# Patient Record
Sex: MALE | Race: WHITE | NOT HISPANIC OR LATINO | Employment: FULL TIME
[De-identification: names, ages, dates, MRNs, and addresses within clinical notes are randomized per-mention and may not be internally consistent; named-entity substitution may affect disease eponyms.]

---

## 2017-10-31 ENCOUNTER — CHARTING TRANS (OUTPATIENT)
Dept: OTHER | Age: 42
End: 2017-10-31

## 2017-10-31 ASSESSMENT — PAIN SCALES - GENERAL: PAINLEVEL_OUTOF10: 5

## 2018-12-02 VITALS
RESPIRATION RATE: 18 BRPM | HEART RATE: 74 BPM | DIASTOLIC BLOOD PRESSURE: 74 MMHG | TEMPERATURE: 98.2 F | SYSTOLIC BLOOD PRESSURE: 120 MMHG

## 2018-12-11 ENCOUNTER — HOSPITAL (OUTPATIENT)
Dept: OTHER | Age: 43
End: 2018-12-11
Attending: EMERGENCY MEDICINE

## 2018-12-11 LAB — S PYO AG THROAT QL: NEGATIVE

## 2018-12-13 LAB — CULTURE STREP GRP A (STTH) HL: NORMAL

## 2020-08-05 ENCOUNTER — WALK IN (OUTPATIENT)
Dept: URGENT CARE | Age: 45
End: 2020-08-05
Attending: EMERGENCY MEDICINE

## 2020-08-05 VITALS
SYSTOLIC BLOOD PRESSURE: 126 MMHG | RESPIRATION RATE: 16 BRPM | TEMPERATURE: 96 F | WEIGHT: 185.19 LBS | DIASTOLIC BLOOD PRESSURE: 84 MMHG | OXYGEN SATURATION: 98 % | HEART RATE: 62 BPM

## 2020-08-05 DIAGNOSIS — S76.211A INGUINAL STRAIN, RIGHT, INITIAL ENCOUNTER: Primary | ICD-10-CM

## 2020-08-05 PROCEDURE — 99212 OFFICE O/P EST SF 10 MIN: CPT

## 2020-08-05 SDOH — HEALTH STABILITY: MENTAL HEALTH: HOW OFTEN DO YOU HAVE A DRINK CONTAINING ALCOHOL?: 2-3 TIMES A WEEK

## 2020-08-05 ASSESSMENT — ENCOUNTER SYMPTOMS
PSYCHIATRIC NEGATIVE: 1
GASTROINTESTINAL NEGATIVE: 1
RESPIRATORY NEGATIVE: 1
CONSTITUTIONAL NEGATIVE: 1

## 2021-06-18 ENCOUNTER — WALK IN (OUTPATIENT)
Dept: URGENT CARE | Age: 46
End: 2021-06-18
Attending: EMERGENCY MEDICINE

## 2021-06-18 VITALS
OXYGEN SATURATION: 97 % | HEART RATE: 62 BPM | SYSTOLIC BLOOD PRESSURE: 134 MMHG | WEIGHT: 187 LBS | RESPIRATION RATE: 16 BRPM | TEMPERATURE: 96.5 F | DIASTOLIC BLOOD PRESSURE: 82 MMHG

## 2021-06-18 DIAGNOSIS — N48.1 BALANITIS: ICD-10-CM

## 2021-06-18 DIAGNOSIS — N34.2 URETHRITIS: Primary | ICD-10-CM

## 2021-06-18 LAB
APPEARANCE UR: CLEAR
BILIRUB UR QL STRIP: NEGATIVE
COLOR UR: YELLOW
GLUCOSE UR STRIP-MCNC: NEGATIVE MG/DL
HGB UR QL STRIP: NEGATIVE
KETONES UR STRIP-MCNC: NEGATIVE MG/DL
LEUKOCYTE ESTERASE UR QL STRIP: NEGATIVE
NITRITE UR QL STRIP: NEGATIVE
PH UR STRIP: 6.5 UNITS (ref 5–7)
PROT UR STRIP-MCNC: NEGATIVE MG/DL
SP GR UR STRIP: >1.03 (ref 1–1.03)
UROBILINOGEN UR STRIP-MCNC: 0.2 MG/DL

## 2021-06-18 PROCEDURE — 99212 OFFICE O/P EST SF 10 MIN: CPT

## 2021-06-18 PROCEDURE — 87491 CHLMYD TRACH DNA AMP PROBE: CPT | Performed by: EMERGENCY MEDICINE

## 2021-06-18 PROCEDURE — 81003 URINALYSIS AUTO W/O SCOPE: CPT | Performed by: EMERGENCY MEDICINE

## 2021-06-18 PROCEDURE — 87086 URINE CULTURE/COLONY COUNT: CPT | Performed by: EMERGENCY MEDICINE

## 2021-06-18 RX ORDER — LEVOCETIRIZINE DIHYDROCHLORIDE 5 MG/1
5 TABLET, FILM COATED ORAL EVERY EVENING
COMMUNITY

## 2021-06-18 RX ORDER — MULTIVITAMIN
POWDER IN PACKET (EA) ORAL
COMMUNITY

## 2021-06-18 RX ORDER — OMEGA-3 FATTY ACIDS/FISH OIL 300-1000MG
2 CAPSULE ORAL DAILY
COMMUNITY

## 2021-06-18 RX ORDER — CLOTRIMAZOLE 1 %
1 CREAM (GRAM) TOPICAL 2 TIMES DAILY
Qty: 30 G | Refills: 0 | Status: SHIPPED | OUTPATIENT
Start: 2021-06-18 | End: 2021-07-02

## 2021-06-18 ASSESSMENT — ENCOUNTER SYMPTOMS
POLYPHAGIA: 0
EYE DISCHARGE: 0
ACTIVITY CHANGE: 0
VOMITING: 0
FEVER: 0
DIARRHEA: 0
EYE REDNESS: 0
HEADACHES: 0
NUMBNESS: 0
BACK PAIN: 0
COLOR CHANGE: 0
FACIAL SWELLING: 0
DIZZINESS: 0
NAUSEA: 0
ABDOMINAL PAIN: 0
EYE PAIN: 0
SORE THROAT: 0
COUGH: 0
CONFUSION: 0
SHORTNESS OF BREATH: 0
POLYDIPSIA: 0
WOUND: 0
WHEEZING: 0
BRUISES/BLEEDS EASILY: 0
CHILLS: 0

## 2021-06-19 LAB — BACTERIA UR CULT: NORMAL

## 2021-06-21 LAB
C TRACH RRNA UR QL NAA+PROBE: NEGATIVE
Lab: NORMAL
N GONORRHOEA RRNA UR QL NAA+PROBE: NEGATIVE

## 2024-01-30 ENCOUNTER — APPOINTMENT (OUTPATIENT)
Dept: GENERAL RADIOLOGY | Facility: HOSPITAL | Age: 49
End: 2024-01-30
Payer: COMMERCIAL

## 2024-01-30 ENCOUNTER — HOSPITAL ENCOUNTER (EMERGENCY)
Facility: HOSPITAL | Age: 49
Discharge: HOME | End: 2024-01-30
Attending: EMERGENCY MEDICINE
Payer: COMMERCIAL

## 2024-01-30 ENCOUNTER — APPOINTMENT (OUTPATIENT)
Dept: GENERAL RADIOLOGY | Facility: HOSPITAL | Age: 49
End: 2024-01-30
Attending: EMERGENCY MEDICINE
Payer: COMMERCIAL

## 2024-01-30 VITALS
OXYGEN SATURATION: 96 % | SYSTOLIC BLOOD PRESSURE: 132 MMHG | TEMPERATURE: 98 F | RESPIRATION RATE: 18 BRPM | DIASTOLIC BLOOD PRESSURE: 78 MMHG | WEIGHT: 185 LBS | HEIGHT: 67 IN | HEART RATE: 80 BPM | BODY MASS INDEX: 29.03 KG/M2

## 2024-01-30 DIAGNOSIS — S63.259A DISLOCATION OF FINGER, INITIAL ENCOUNTER: Primary | ICD-10-CM

## 2024-01-30 PROCEDURE — 26770 TREAT FINGER DISLOCATION: CPT

## 2024-01-30 PROCEDURE — 73140 X-RAY EXAM OF FINGER(S): CPT | Performed by: EMERGENCY MEDICINE

## 2024-01-30 PROCEDURE — 99284 EMERGENCY DEPT VISIT MOD MDM: CPT

## 2024-01-30 RX ORDER — IBUPROFEN 600 MG/1
600 TABLET ORAL ONCE
Status: COMPLETED | OUTPATIENT
Start: 2024-01-30 | End: 2024-01-30

## 2024-01-30 RX ORDER — IBUPROFEN 600 MG/1
600 TABLET ORAL EVERY 8 HOURS PRN
Qty: 30 TABLET | Refills: 0 | Status: SHIPPED | OUTPATIENT
Start: 2024-01-30

## 2024-01-30 NOTE — ED INITIAL ASSESSMENT (HPI)
Pt presents to for a right pinky dislocation that happened playing basketball today. +deformity noted.

## 2024-01-30 NOTE — DISCHARGE INSTRUCTIONS
Pt states she would like her nurse Quan Shepherd from home healthcare back.  Please call her back Take ibuprofen 600 mg every 8 hours for the next 2 days and then as needed for pain thereafter.    Use ice to help with pain and swelling.    See hand surgery for follow-up.

## 2024-01-30 NOTE — ED QUICK NOTES
Pt provided with discharge instruction, verbalized understand for plan of care at home and follow up. All question and concerns addressed prior to discharge.      Splint placed, CMS present

## 2024-01-30 NOTE — ED PROVIDER NOTES
Patient Seen in: [facility] Emergency Department      History     Chief Complaint   Patient presents with    Finger Injury     Stated Complaint: finger dislocation    Subjective:   HPI    48-year-old male presents for evaluation of finger dislocation.  Patient was playing basketball, reports he caught a pass that grazed his finger, dislocated his right pinky.   at the gym was unable to reduce.    Objective:   History reviewed. No pertinent past medical history.           History reviewed. No pertinent surgical history.             Social History     Socioeconomic History    Marital status:    Tobacco Use    Smoking status: Never    Smokeless tobacco: Never   Substance and Sexual Activity    Alcohol use: Not Currently    Drug use: Not Currently              Review of Systems    Positive for stated complaint: finger dislocation  Other systems are as noted in HPI.  Constitutional and vital signs reviewed.      All other systems reviewed and negative except as noted above.    Physical Exam     ED Triage Vitals   BP 01/30/24 0727 143/90   Pulse 01/30/24 0726 84   Resp 01/30/24 0726 18   Temp 01/30/24 0726 97.6 °F (36.4 °C)   Temp src 01/30/24 0726 Temporal   SpO2 01/30/24 0726 95 %   O2 Device 01/30/24 0726 None (Room air)       Current:/78   Pulse 80   Temp 97.6 °F (36.4 °C) (Temporal)   Resp 18   Ht 170.2 cm (5' 7\")   Wt 83.9 kg   SpO2 96%   BMI 28.98 kg/m²         Physical Exam  Vitals and nursing note reviewed.   Constitutional:       General: He is not in acute distress.     Appearance: Normal appearance. He is not toxic-appearing.   HENT:      Head: Normocephalic and atraumatic.   Eyes:      Conjunctiva/sclera: Conjunctivae normal.   Cardiovascular:      Rate and Rhythm: Normal rate and regular rhythm.      Pulses:           Radial pulses are 2+ on the right side and 2+ on the left side.   Pulmonary:      Effort: Pulmonary effort is normal. No respiratory distress.    Musculoskeletal:         General: Normal range of motion.      Cervical back: Normal range of motion and neck supple. No rigidity.   Neurological:      General: No focal deficit present.      Mental Status: He is alert.   Psychiatric:         Mood and Affect: Mood normal.               ED Course   Labs Reviewed - No data to display          XR FINGER(S) (MIN 2 VIEWS), RIGHT 5TH (CPT=73140)    Result Date: 1/30/2024  CONCLUSION:   Interval reduction 5th proximal interphalangeal joint now in anatomic alignment.  No acute fracture.    Dictated by (CST): Rickey Harper MD on 1/30/2024 at 8:57 AM     Finalized by (CST): Rickey Harper MD on 1/30/2024 at 9:00 AM          XR FINGER(S) (MIN 2 VIEWS), RIGHT 5TH (CPT=73140)    Result Date: 1/30/2024  CONCLUSION:   Dorsal dislocation 5th proximal interphalangeal joint.  No definite acute fracture.    Dictated by (CST): Rickey Harper MD on 1/30/2024 at 8:00 AM     Finalized by (CST): Rickey Harper MD on 1/30/2024 at 8:01 AM                  MDM                                       Medical Decision Making  Digital block performed with successful reduction of finger dislocation, patient splinted and discharged with hand surgery follow-up and supportive care.    Problems Addressed:  Dislocation of finger, initial encounter: acute illness or injury    Amount and/or Complexity of Data Reviewed  Radiology: ordered and independent interpretation performed.     Details: X-ray images reviewed, subsequent dislocation and reduction noted, other incidental findings deferred to radiology        Disposition and Plan     Clinical Impression:  1. Dislocation of finger, initial encounter         Disposition:  Discharge  1/30/2024  9:20 am    Follow-up:  Kevin Ventura MD  [address] [address]  [phone]    Schedule an appointment as soon as possible for a visit  For follow up          Medications Prescribed:  Discharge Medication List as of  1/30/2024  9:29 AM        START taking these medications    Details   ibuprofen 600 MG Oral Tab Take 1 tablet (600 mg total) by mouth every 8 (eight) hours as needed for Pain or Fever., Normal, Disp-30 tablet, R-0